# Patient Record
Sex: FEMALE | Race: WHITE
[De-identification: names, ages, dates, MRNs, and addresses within clinical notes are randomized per-mention and may not be internally consistent; named-entity substitution may affect disease eponyms.]

---

## 2018-09-06 ENCOUNTER — HOSPITAL ENCOUNTER (OUTPATIENT)
Dept: HOSPITAL 92 - SCSMAMMO | Age: 53
Discharge: HOME | End: 2018-09-06
Payer: COMMERCIAL

## 2018-09-06 DIAGNOSIS — Z12.31: Primary | ICD-10-CM

## 2018-09-06 PROCEDURE — 77067 SCR MAMMO BI INCL CAD: CPT

## 2018-09-10 NOTE — MMO
BILATERAL DIGITAL SCREENING MAMMOGRAMS:

 

Date:  09/06/18 

 

HISTORY:  

42-year-old female presents for digital screening mammogram. 

 

COMPARISON:  

08/05/11, 03/19/14. 

 

FINDINGS:

 

This patient's mammogram was interpreted with the assistance of computer-aided detection.  

 

Scattered areas of fibroglandular density are noted bilaterally. No direct or indirect evidence of ma
lignancy. 

 

IMPRESSION: 

 

BIRADS 1:  Negative

 

Continue routine screening. 

 

 

POS: [facility]